# Patient Record
Sex: MALE | Race: WHITE | HISPANIC OR LATINO | Employment: FULL TIME | URBAN - METROPOLITAN AREA
[De-identification: names, ages, dates, MRNs, and addresses within clinical notes are randomized per-mention and may not be internally consistent; named-entity substitution may affect disease eponyms.]

---

## 2024-08-23 ENCOUNTER — OFFICE VISIT (OUTPATIENT)
Dept: INTERNAL MEDICINE | Facility: CLINIC | Age: 34
End: 2024-08-23
Payer: COMMERCIAL

## 2024-08-23 VITALS
HEIGHT: 68 IN | TEMPERATURE: 99.3 F | DIASTOLIC BLOOD PRESSURE: 90 MMHG | HEART RATE: 82 BPM | WEIGHT: 212.3 LBS | SYSTOLIC BLOOD PRESSURE: 150 MMHG | OXYGEN SATURATION: 99 % | BODY MASS INDEX: 32.18 KG/M2

## 2024-08-23 DIAGNOSIS — H54.61 VISION LOSS, RIGHT EYE: Primary | ICD-10-CM

## 2024-08-23 DIAGNOSIS — Z13.0 SCREENING FOR BLOOD DISEASE: ICD-10-CM

## 2024-08-23 DIAGNOSIS — Z91.09 ENVIRONMENTAL ALLERGIES: ICD-10-CM

## 2024-08-23 DIAGNOSIS — Z13.1 DIABETES MELLITUS SCREENING: ICD-10-CM

## 2024-08-23 DIAGNOSIS — Z13.21 ENCOUNTER FOR VITAMIN DEFICIENCY SCREENING: ICD-10-CM

## 2024-08-23 DIAGNOSIS — Z13.220 LIPID SCREENING: ICD-10-CM

## 2024-08-23 DIAGNOSIS — Z23 NEED FOR VACCINATION: ICD-10-CM

## 2024-08-23 DIAGNOSIS — K42.9 UMBILICAL HERNIA WITHOUT OBSTRUCTION AND WITHOUT GANGRENE: ICD-10-CM

## 2024-08-23 DIAGNOSIS — N48.89 PENILE PAIN: ICD-10-CM

## 2024-08-23 DIAGNOSIS — Z13.29 SCREENING FOR THYROID DISORDER: ICD-10-CM

## 2024-08-23 RX ORDER — LEVOCETIRIZINE DIHYDROCHLORIDE 5 MG/1
1 TABLET, FILM COATED ORAL DAILY
COMMUNITY
Start: 2024-07-30

## 2024-08-23 NOTE — LETTER
[facility]  Vaccine Consent Form    Patient Name:  Mark Zamora  Patient :  1990     Vaccine(s) Ordered    HPV Vaccine  Tdap Vaccine Greater Than or Equal To 6yo IM        Screening Checklist  The following questions should be completed prior to vaccination. If you answer “yes” to any question, it does not necessarily mean you should not be vaccinated. It just means we may need to clarify or ask more questions. If a question is unclear, please ask your healthcare provider to explain it.    Yes No   Any fever or moderate to severe illness today (mild illness and/or antibiotic treatment are not contraindications)?     Do you have a history of a serious reaction to any previous vaccinations, such as anaphylaxis, encephalopathy within 7 days, Guillain-Dunkirk syndrome within 6 weeks, seizure?     Have you received any live vaccine(s) (e.g MMR, ANEESH) or any other vaccines in the last month (to ensure duplicate doses aren't given)?     Do you have an anaphylactic allergy to latex (DTaP, DTaP-IPV, Hep A, Hep B, MenB, RV, Td, Tdap), baker’s yeast (Hep B, HPV), polysorbates (RSV, nirsevimab, PCV 20, Rotavirrus, Tdap, Shingrix), or gelatin (ANEESH, MMR)?     Do you have an anaphylactic allergy to neomycin (Rabies, ANEESH, MMR, IPV, Hep A), polymyxin B (IPV), or streptomycin (IPV)?      Any cancer, leukemia, AIDS, or other immune system disorder? (ANEESH, MMR, RV)     Do you have a parent, brother, or sister with an immune system problem (if immune competence of vaccine recipient clinically verified, can proceed)? (MMR, ANEESH)     Any recent steroid treatments for >2 weeks, chemotherapy, or radiation treatment? (ANEESH, MMR)     Have you received antibody-containing blood transfusions or IVIG in the past 11 months (recommended interval is dependent on product)? (MMR, ANEESH)     Have you taken antiviral drugs (acyclovir, famciclovir, valacyclovir for ANEESH) in the last 24 or 48 hours, respectively?      Are you pregnant or planning to  "become pregnant within 1 month? (ANEESH, MMR, HPV, IPV, MenB, Abrexvy; For Hep B- refer to Engerix-B; For RSV - Abrysvo is indicated for 32-36 weeks of pregnancy from September to January)     For infants, have you ever been told your child has had intussusception or a medical emergency involving obstruction of the intestine (Rotavirus)? If not for an infant, can skip this question.         *Ordering Physicians/APC should be consulted if \"yes\" is checked by the patient or guardian above.  I have received, read, and understand the Vaccine Information Statement (VIS) for each vaccine ordered.  I have considered my or my child's health status as well as the health status of my close contacts.  I have taken the opportunity to discuss my vaccine questions with my or my child's health care provider.   I have requested that the ordered vaccine(s) be given to me or my child.  I understand the benefits and risks of the vaccines.  I understand that I should remain in the clinic for 15 minutes after receiving the vaccine(s).  _________________________________________________________  Signature of Patient or Parent/Legal Guardian ____________________  Date     "

## 2024-08-23 NOTE — PROGRESS NOTES
[facility] Internal Medicine    Mark Zamora  1990   [id number]      Patient Care Team:  Carmenza Flores PA-C as PCP - General (Physician Assistant)    Chief Complaint::   Chief Complaint   Patient presents with    Hernia     Umbilical Noticed 2 months ago. No pain        HPI  Mark Zamora is a 34-year-old male date of birth 1990 who presents today to establish care.  He is referred by his Sister Linda, who is also an established patient of mine.  He was born and raised in [place], has lived in the  for 4 years.  He lives in [place] and works full-time at [employer] in [employer].  He works first shift from 5:30 AM to 2:45 PM with occasional overtime to 5:00 PM.  He does have weekends off.      He reports mother and father both live back home in [place].  Mother-HTN  Father--HTN, obesity  Sister-asthma.    Surgeries include sinus surgery in 2012.  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  He is also had left knee surgery.    Mark is interested in HPV vaccinations today.  He also inquires about Mpox vaccine, although this is unavailable.  He has not had fasting labs.  He reports umbilical hernia which is gradually worsened over the past several years.  He reports there is pain and discomfort.  He denies change in bowel movements.  He desires circumcision.  He reports there is pain and tightness with intercourse.  Would like referral.    Patient Active Problem List   Diagnosis    Vision loss, right eye    Environmental allergies    Penile pain    Need for vaccination    Screening for blood disease    Screening for thyroid disorder    Encounter for vitamin deficiency screening    Lipid screening    Umbilical hernia without obstruction and without gangrene        No past medical history on file.    Past Surgical History:   Procedure Laterality Date    EYE SURGERY Right     optic nerve severed during sinus surgery     "KNEE CARTILAGE SURGERY Left     high school in [facility]    SINUS SURGERY  2012       Family History   Problem Relation Age of Onset    Hypertension Mother     Hypertension Father        Social History     Socioeconomic History    Marital status: Single   Tobacco Use    Smoking status: Some Days     Types: Cigarettes   Vaping Use    Vaping status: Some Days    Substances: Nicotine   Substance and Sexual Activity    Alcohol use: Yes     Alcohol/week: 1.0 standard drink of alcohol     Types: 1 Cans of beer per week    Drug use: Never       No Known Allergies    Review of Systems   Constitutional:  Negative for activity change, appetite change, diaphoresis, fatigue, unexpected weight gain and unexpected weight loss.   HENT:  Negative for hearing loss.    Eyes:  Negative for visual disturbance.        Vision loss of the right eye   Respiratory:  Negative for chest tightness and shortness of breath.    Cardiovascular:  Negative for chest pain, palpitations and leg swelling.   Gastrointestinal:  Positive for abdominal distention. Negative for abdominal pain, blood in stool, GERD and indigestion.   Endocrine: Negative for cold intolerance and heat intolerance.   Genitourinary:  Negative for dysuria and hematuria.   Musculoskeletal:  Negative for arthralgias and myalgias.   Skin:  Negative for skin lesions.   Neurological:  Negative for tremors, seizures, syncope, speech difficulty, weakness, headache, memory problem and confusion.   Hematological:  Does not bruise/bleed easily.   Psychiatric/Behavioral:  Negative for sleep disturbance and depressed mood. The patient is not nervous/anxious.         Vital Signs  Vitals:    08/23/24 1437   BP: 150/90   BP Location: Right arm   Patient Position: Sitting   Cuff Size: Adult   Pulse: 82   Temp: 99.3 °F (37.4 °C)   TempSrc: Infrared   SpO2: 99%   Weight: 96.3 kg (212 lb 4.8 oz)   Height: 173 cm (68.11\")   PainSc: 0-No pain     Body mass index is 32.18 kg/m².  BMI is >= 30 and <35. " (Class 1 Obesity). The following options were offered after discussion;: weight loss educational material (shared in after visit summary), exercise counseling/recommendations, and nutrition counseling/recommendations     Advance Care Planning   ACP discussion was held with the patient during this visit. Patient does not have an advance directive, information provided.       Current Outpatient Medications:     levocetirizine (XYZAL) 5 MG tablet, Take 1 tablet by mouth Daily., Disp: , Rfl:     Physical Exam  Vitals reviewed.   Constitutional:       Appearance: Normal appearance. He is well-developed.   HENT:      Head: Normocephalic and atraumatic.      Right Ear: Hearing, tympanic membrane, ear canal and external ear normal.      Left Ear: Hearing, tympanic membrane, ear canal and external ear normal.      Nose: Nose normal.      Mouth/Throat:      Pharynx: Uvula midline.   Eyes:      General: Lids are normal.      Conjunctiva/sclera: Conjunctivae normal.      Pupils: Pupils are equal, round, and reactive to light.   Cardiovascular:      Rate and Rhythm: Normal rate and regular rhythm.      Pulses: Normal pulses.      Heart sounds: Normal heart sounds.   Pulmonary:      Effort: Pulmonary effort is normal.      Breath sounds: Normal breath sounds.   Abdominal:      General: Bowel sounds are normal. There is distension.      Palpations: Abdomen is soft.      Tenderness: There is abdominal tenderness.      Comments: Nickel sized hernia at umbilicus   Musculoskeletal:         General: Normal range of motion.      Cervical back: Full passive range of motion without pain, normal range of motion and neck supple.   Skin:     General: Skin is warm and dry.   Neurological:      General: No focal deficit present.      Mental Status: He is alert and oriented to person, place, and time. Mental status is at baseline.      Deep Tendon Reflexes: Reflexes are normal and symmetric.   Psychiatric:         Speech: Speech normal.          Behavior: Behavior normal.         Thought Content: Thought content normal.         Judgment: Judgment normal.          ACE III MINI            Results Review:    No results found for this or any previous visit (from the past 672 hour(s)).  Procedures    Medication Review: Medications reviewed and noted    Social History     Socioeconomic History    Marital status: Single   Tobacco Use    Smoking status: Some Days     Types: Cigarettes   Vaping Use    Vaping status: Some Days    Substances: Nicotine   Substance and Sexual Activity    Alcohol use: Yes     Alcohol/week: 1.0 standard drink of alcohol     Types: 1 Cans of beer per week    Drug use: Never        Assessment/Plan:    Diagnoses and all orders for this visit:    1. Vision loss, right eye (Primary)  Overview:  Optic nerve severed during sinus surgery in [facility].      2. Environmental allergies  Overview:  Xyzal daily, which helps.      3. Penile pain  -     Ambulatory Referral to Urology    4. Need for vaccination  -     Cancel: Tdap Vaccine Greater Than or Equal To 6yo IM  -     HPV Vaccine  -     Tdap Vaccine Greater Than or Equal To 6yo IM    5. Screening for blood disease  -     Cancel: CBC & Differential; Future  -     CBC & Differential; Future    6. Screening for thyroid disorder  -     Cancel: TSH; Future  -     Cancel: T3, Free; Future  -     Cancel: T4, Free; Future  -     T3, Free; Future  -     T4, Free; Future  -     TSH; Future    7. Diabetes mellitus screening  -     Cancel: Comprehensive Metabolic Panel; Future  -     Cancel: Hemoglobin A1c; Future  -     Comprehensive Metabolic Panel; Future  -     Hemoglobin A1c; Future    8. Encounter for vitamin deficiency screening  -     Cancel: Vitamin B12; Future  -     Cancel: Vitamin D,25-Hydroxy; Future  -     Vitamin B12; Future  -     Vitamin D,25-Hydroxy; Future    9. Lipid screening  -     Cancel: Lipid Panel; Future  -     Lipid Panel; Future    10. Umbilical hernia without obstruction and  without gangrene  -     Ambulatory Referral to General Surgery         Patient Instructions   Voluntades anticipadas  Advance Directive    Las voluntades anticipadas son documentos legales que le permiten vicki decisiones acerca de michael tratamiento médico y atención de la rojas en randi de no poder expresarse usted mismo. Las voluntades anticipadas comunican annabel deseos a familiares, amigos y médicos.  La elaboración y la redacción de las voluntades anticipadas deben realizarse con el transcurso del tiempo, en lugar de que suceda todo a la misma vez. Las voluntades anticipadas se pueden cambiar y actualizar en cualquier momento. Hay diferentes tipos de voluntades anticipadas, por ejemplo:  Poder notarial médico.  Testamento vital.  Orden de no reanimar (ONR) o de no intentar la reanimación (ONIR).  Apoderado para asuntos de rojas y poder notarial médico  Al apoderado para asuntos de rojas también se le llama representante para asuntos de rojas. Esta persona es designada para vicki decisiones médicas en representación suya cuando usted no pueda vikci decisiones por usted mismo. Por lo general, las personas le piden a un amigo o familiar de confianza que actúe dahlia michael apoderado y represente annabel preferencias. Asegúrese de tener un acuerdo con michael persona de confianza para que actúe dahlia michael apoderado. Es posible que un apoderado deba vicki ryland decisión médica en michael nombre si no se conocen annabel deseos.  Un poder notarial médico, también llamado poder notarial duradero para asuntos de rojas, es un documento legal que nombra a michael apoderado para asuntos de rojas. En función de las leyes de michael estado, es posible que el documento también deba estar:  Firmado.  Autenticado ante ranjana.  Fechado.  Copiado.  Atestiguado.  Incorporado a la historia clínica del paciente.  También es posible que desee designar a ryland persona de confianza para administrar michael dinero en randi de que usted no pueda hacerlo. [place] se denomina poder notarial  duradero para asuntos financieros. Nila es un documento legal diferente del poder notarial duradero para asuntos de rojas. Usted puede elegir a michael apoderado para asuntos de rojas o a ryland persona diferente para que actúe dahlia michael apoderado en los asuntos de dinero.  Si no asigna un apoderado, o si se considera que el apoderado no está actuando para michael odalis, un tribunal podrá designar un  legal para que actúe en representación suya.  El testamento vital  El testamento vital es un conjunto de instrucciones en las que se establecen annabel deseos acerca de la atención médica para el momento en que no pueda expresarlos usted mismo. Los médicos deben conservar ryland copia del testamento vital en michael historia clínica. Le recomendamos que entregue ryland copia a familiares o amigos. Con el fin de alertar a annabel cuidadores en randi de ryland emergencia, puede colocar ryland tarjeta en la billetera que indique que tiene un testamento vital y dónde encontrarlo. El testamento vital se usa si:  Tiene ryland enfermedad terminal.  Queda discapacitado.  No puede comunicarse ni vicki decisiones.  Debe incluir las siguientes decisiones en michael testamento vital:  El uso o no uso de equipos de soporte vital, dahlia dispositivos de diálisis y máquinas respiratorias (respiradores).  Si desea ryland ONR o ryland ONIR. Ages les indica a los médicos que no usen resucitación cardiopulmonar (RCP) si se le detiene la respiración o los latidos cardíacos.  El uso o no uso de alimentación por sonda.  La administración o no administración de alimentos y líquidos.  Si desea recibir atención de alivio (cuidados paliativos) cuando el objetivo sea la comodidad más que la angeles.  Si desea donar annabel órganos y tejidos.  El testamento vital no da instrucciones acerca de la distribución del dinero ni las propiedades en randi de fallecimiento.  ONR u ONIR  La ONR es el pedido de no realizar RCP en el randi de que el corazón o la respiración se detengan. Si no se realizó ni se presentó  ryland orden ONR u ONIR, el médico intentará ayudar a cualquier paciente cuyo corazón o respiración se haya detenido. Si planea someterse a ryland cirugía, hable con michael médico sobre el cumplimiento de michael ONR u ONIR en randi de que surjan problemas.  ¿Qué sucede si no tengo ryland voluntad anticipada?  Algunos estados asignan familiares a cargo de la bárbara de decisiones para que actúen en representación suya si no tiene ryland voluntad anticipada. Cada estado tiene annabel propias leyes sobre las voluntades anticipadas. Es posible que desee consultar a michael médico, michael abogado o al representante estatal acerca de las leyes de michael estado.  Resumen  Las voluntades anticipadas son documentos legales que le permiten vicki decisiones acerca de michael tratamiento médico y atención de la rojas en randi de no poder expresarse usted mismo.  El proceso de elaborar y redactar las voluntades anticipadas debe realizarse con el transcurso del tiempo. Usted puede cambiar y actualizar las voluntades anticipadas en cualquier momento.  Las voluntades anticipadas pueden incluir un poder médico, un testamento vital y ryland orden ONR u ONIR.  Esta información no tiene dahlia fin reemplazar el consejo del médico. Asegúrese de hacerle al médico cualquier pregunta que tenga.  Document Revised: 10/14/2021 Document Reviewed: 10/14/2021  Elsevier Patient Education © 2024 Elsevier Inc.  BMI for Adults  Body mass index (BMI) is a number found using a person's weight and height. BMI can help tell how much of a person's weight is made up of fat. BMI does not measure body fat directly. It is used instead of tests that directly measure body fat, which can be difficult and expensive.  What are BMI measurements used for?  BMI is useful to:  Find out if your weight puts you at higher risk for medical problems.  Help recommend changes, such as in diet and exercise. This can help you reach a healthy weight. BMI screening can be done again to see if these changes are working.  How is  "BMI calculated?  Your height and weight are measured. The BMI is found from those numbers. This can be done with U.S. or metric measurements. Note that charts and online BMI calculators are available to help you find your BMI quickly and easily without doing these calculations.  To calculate your BMI in U.S. measurements:  Measure your weight in pounds (lb).  Multiply the number of pounds by 703.  So, for an adult who weighs 150 lb, multiply that number by 703: 150 x 703, which equals 105,450.  Measure your height in inches. Then multiply that number by itself to get a measurement called \"inches squared.\"  So, for an adult who is 70 inches tall, the \"inches squared\" measurement is 70 inches x 70 inches, which equals 4,900 inches squared.  Divide the total from step 2 (number of lb x 703) by the total from step 3 (inches squared): 105,450 ÷ 4,900 = 21.5. This is your BMI.  To calculate your BMI in metric measurements:    Measure your weight in kilograms (kg).  For this example, the weight is 70 kg.  Measure your height in meters (m). Then multiply that number by itself to get a measurement called \"meters squared.\"  So, for an adult who is 1.75 m tall, the \"meters squared\" measurement is 1.75 m x 1.75 m, which equals 3.1 meters squared.  Divide the number of kilograms (your weight) by the meters squared number. In this example: 70 ÷ 3.1 = 22.6. This is your BMI.  What do the results mean?  BMI charts are used to see if you are underweight, normal weight, overweight, or obese. The following guidelines will be used:  Underweight: BMI less than 18.5.  Normal weight: BMI between 18.5 and 24.9.  Overweight: BMI between 25 and 29.9.  Obese: BMI of 30 or above.  BMI is a tool and cannot diagnose a condition. Talk with your health care provider about what your BMI means for you. Keep these notes in mind:  Weight includes fat and muscle. Someone with a muscular build, such as an athlete, may have a BMI that is higher than " 24.9. In cases like these, BMI is not a correct measure of body fat.  If you have a BMI of 25 or higher, your provider may need to do more testing to find out if excess body fat is the cause.  BMI is measured the same way for males and females. Females usually have more body fat than males of the same height and weight.  Where to find more information  For more information about BMI, including tools to quickly find your BMI, go to:  Centers for Disease Control and Prevention: cdc.gov  American Heart Association: heart.org  National Heart, Lung, [facility] [place]: nhlbi.nih.gov  This information is not intended to replace advice given to you by your health care provider. Make sure you discuss any questions you have with your health care provider.  Document Revised: 09/07/2023 Document Reviewed: 08/31/2023  [employer] Patient Education © 2024 [employer]         Plan of care reviewed with patient at the conclusion of today's visit. Education was provided regarding diagnosis, management, and any prescribed or recommended OTC medications.Patient verbalizes understanding of and agreement with management plan.         I spent 30 minutes caring for Mark on this date of service. This time includes time spent by me in the following activities:preparing for the visit, reviewing tests, obtaining and/or reviewing a separately obtained history, performing a medically appropriate examination and/or evaluation , counseling and educating the patient/family/caregiver, ordering medications, tests, or procedures, referring and communicating with other health care professionals , and documenting information in the medical record    Carmenza Flores PA-C      Note: Part of this note may be an electronic transcription/translation of spoken language to printed text using the Dragon Dictation system.

## 2024-08-25 PROBLEM — N48.89 PENILE PAIN: Status: ACTIVE | Noted: 2024-08-25

## 2024-08-25 PROBLEM — Z23 NEED FOR VACCINATION: Status: ACTIVE | Noted: 2024-08-25

## 2024-08-25 PROBLEM — Z13.21 ENCOUNTER FOR VITAMIN DEFICIENCY SCREENING: Status: ACTIVE | Noted: 2024-08-25

## 2024-08-25 PROBLEM — Z13.220 LIPID SCREENING: Status: ACTIVE | Noted: 2024-08-25

## 2024-08-25 PROBLEM — Z13.29 SCREENING FOR THYROID DISORDER: Status: ACTIVE | Noted: 2024-08-25

## 2024-08-25 PROBLEM — K42.9 UMBILICAL HERNIA WITHOUT OBSTRUCTION AND WITHOUT GANGRENE: Status: ACTIVE | Noted: 2024-08-25

## 2024-08-25 PROBLEM — Z13.0 SCREENING FOR BLOOD DISEASE: Status: ACTIVE | Noted: 2024-08-25

## 2024-08-25 NOTE — PATIENT INSTRUCTIONS
Voluntades anticipadas  Advance Directive    Las voluntades anticipadas son documentos legales que le permiten vicki decisiones acerca de michael tratamiento médico y atención de la rojas en randi de no poder expresarse usted mismo. Las voluntades anticipadas comunican annabel deseos a familiares, amigos y médicos.  La elaboración y la redacción de las voluntades anticipadas deben realizarse con el transcurso del tiempo, en lugar de que suceda todo a la misma vez. Las voluntades anticipadas se pueden cambiar y actualizar en cualquier momento. Hay diferentes tipos de voluntades anticipadas, por ejemplo:  Poder notarial médico.  Testamento vital.  Orden de no reanimar (ONR) o de no intentar la reanimación (ONIR).  Apoderado para asuntos de rojas y poder notarial médico  Al apoderado para asuntos de rojas también se le llama representante para asuntos de rojas. Esta persona es designada para vicki decisiones médicas en representación suya cuando usted no pueda vicki decisiones por usted mismo. Por lo general, las personas le piden a un amigo o familiar de confianza que actúe dahlia michael apoderado y represente annabel preferencias. Asegúrese de tener un acuerdo con michael persona de confianza para que actúe dahlia michael apoderado. Es posible que un apoderado deba vicki ryland decisión médica en michael nombre si no se conocen annabel deseos.  Un poder notarial médico, también llamado poder notarial duradero para asuntos de rojas, es un documento legal que nombra a michael apoderado para asuntos de rojas. En función de las leyes de michael estado, es posible que el documento también deba estar:  Firmado.  Autenticado ante ranjana.  Fechado.  Copiado.  Atestiguado.  Incorporado a la historia clínica del paciente.  También es posible que desee designar a ryland persona de confianza para administrar michael dinero en randi de que usted no pueda hacerlo. [place] se denomina poder notarial duradero para asuntos financieros. [place] es un documento legal diferente del poder notarial  duradero para asuntos de rojas. Usted puede elegir a michael apoderado para asuntos de rojas o a ryland persona diferente para que actúe dahlia micheal apoderado en los asuntos de dinero.  Si no asigna un apoderado, o si se considera que el apoderado no está actuando para michael odalis, un tribunal podrá designar un  legal para que actúe en representación suya.  El testamento vital  El testamento vital es un conjunto de instrucciones en las que se establecen annabel deseos acerca de la atención médica para el momento en que no pueda expresarlos usted mismo. Los médicos deben conservar ryland copia del testamento vital en michael historia clínica. Le recomendamos que entregue ryland copia a familiares o amigos. Con el fin de alertar a annabel cuidadores en randi de ryland emergencia, puede colocar ryland tarjeta en la billetera que indique que tiene un testamento vital y dónde encontrarlo. El testamento vital se usa si:  Tiene ryland enfermedad terminal.  Queda discapacitado.  No puede comunicarse ni vicki decisiones.  Debe incluir las siguientes decisiones en michael testamento vital:  El uso o no uso de equipos de soporte vital, dahlia dispositivos de diálisis y máquinas respiratorias (respiradores).  Si desea ryland ONR o ryland ONIR. [place] les indica a los médicos que no usen resucitación cardiopulmonar (RCP) si se le detiene la respiración o los latidos cardíacos.  El uso o no uso de alimentación por sonda.  La administración o no administración de alimentos y líquidos.  Si desea recibir atención de alivio (cuidados paliativos) cuando el objetivo sea la comodidad más que la angeles.  Si desea donar annabel órganos y tejidos.  El testamento vital no da instrucciones acerca de la distribución del dinero ni las propiedades en randi de fallecimiento.  ONR u ONIR  La ONR es el pedido de no realizar RCP en el randi de que el corazón o la respiración se detengan. Si no se realizó ni se presentó ryland orden ONR u ONIR, el médico intentará ayudar a cualquier paciente cuyo corazón o  respiración se haya detenido. Si planea someterse a ryland cirugía, hable con michael médico sobre el cumplimiento de michael ONR u ONIR en randi de que surjan problemas.  ¿Qué sucede si no tengo ryland voluntad anticipada?  Algunos estados asignan familiares a cargo de la bárbara de decisiones para que actúen en representación suya si no tiene ryland voluntad anticipada. Cada estado tiene annabel propias leyes sobre las voluntades anticipadas. Es posible que desee consultar a michael médico, michael abogado o al representante estatal acerca de las leyes de michael estado.  Resumen  Las voluntades anticipadas son documentos legales que le permiten vicki decisiones acerca de michael tratamiento médico y atención de la rojas en randi de no poder expresarse usted mismo.  El proceso de elaborar y redactar las voluntades anticipadas debe realizarse con el transcurso del tiempo. Usted puede cambiar y actualizar las voluntades anticipadas en cualquier momento.  Las voluntades anticipadas pueden incluir un poder médico, un testamento vital y ryland orden ONR u ONIR.  Esta información no tiene dahlia fin reemplazar el consejo del médico. Asegúrese de hacerle al médico cualquier pregunta que tenga.  Document Revised: 10/14/2021 Document Reviewed: 10/14/2021  Elsevier Patient Education © 2024 [employer]  BMI for Adults  Body mass index (BMI) is a number found using a person's weight and height. BMI can help tell how much of a person's weight is made up of fat. BMI does not measure body fat directly. It is used instead of tests that directly measure body fat, which can be difficult and expensive.  What are BMI measurements used for?  BMI is useful to:  Find out if your weight puts you at higher risk for medical problems.  Help recommend changes, such as in diet and exercise. This can help you reach a healthy weight. BMI screening can be done again to see if these changes are working.  How is BMI calculated?  Your height and weight are measured. The BMI is found from those  "numbers. This can be done with U.S. or metric measurements. Note that charts and online BMI calculators are available to help you find your BMI quickly and easily without doing these calculations.  To calculate your BMI in U.S. measurements:  Measure your weight in pounds (lb).  Multiply the number of pounds by 703.  So, for an adult who weighs 150 lb, multiply that number by 703: 150 x 703, which equals 105,450.  Measure your height in inches. Then multiply that number by itself to get a measurement called \"inches squared.\"  So, for an adult who is 70 inches tall, the \"inches squared\" measurement is 70 inches x 70 inches, which equals 4,900 inches squared.  Divide the total from step 2 (number of lb x 703) by the total from step 3 (inches squared): 105,450 ÷ 4,900 = 21.5. This is your BMI.  To calculate your BMI in metric measurements:    Measure your weight in kilograms (kg).  For this example, the weight is 70 kg.  Measure your height in meters (m). Then multiply that number by itself to get a measurement called \"meters squared.\"  So, for an adult who is 1.75 m tall, the \"meters squared\" measurement is 1.75 m x 1.75 m, which equals 3.1 meters squared.  Divide the number of kilograms (your weight) by the meters squared number. In this example: 70 ÷ 3.1 = 22.6. This is your BMI.  What do the results mean?  BMI charts are used to see if you are underweight, normal weight, overweight, or obese. The following guidelines will be used:  Underweight: BMI less than 18.5.  Normal weight: BMI between 18.5 and 24.9.  Overweight: BMI between 25 and 29.9.  Obese: BMI of 30 or above.  BMI is a tool and cannot diagnose a condition. Talk with your health care provider about what your BMI means for you. Keep these notes in mind:  Weight includes fat and muscle. Someone with a muscular build, such as an athlete, may have a BMI that is higher than 24.9. In cases like these, BMI is not a correct measure of body fat.  If you have a " BMI of 25 or higher, your provider may need to do more testing to find out if excess body fat is the cause.  BMI is measured the same way for males and females. Females usually have more body fat than males of the same height and weight.  Where to find more information  For more information about BMI, including tools to quickly find your BMI, go to:  Centers for Disease Control and Prevention: cdc.gov  American Heart Association: heart.org  National Heart, Lung, and Blood [place]: nhlbi.nih.gov  This information is not intended to replace advice given to you by your health care provider. Make sure you discuss any questions you have with your health care provider.  Document Revised: 09/07/2023 Document Reviewed: 08/31/2023  Elsevier Patient Education © 2024 Elsevier Inc.

## 2024-09-22 DIAGNOSIS — E55.9 VITAMIN D DEFICIENCY: Primary | ICD-10-CM

## 2024-09-22 RX ORDER — ERGOCALCIFEROL 1.25 MG/1
50000 CAPSULE, LIQUID FILLED ORAL WEEKLY
Qty: 12 CAPSULE | Refills: 0 | Status: SHIPPED | OUTPATIENT
Start: 2024-09-22

## 2024-09-23 DIAGNOSIS — E78.2 MIXED HYPERLIPIDEMIA: Primary | ICD-10-CM

## 2024-09-23 RX ORDER — ROSUVASTATIN CALCIUM 5 MG/1
5 TABLET, COATED ORAL DAILY
Qty: 90 TABLET | Refills: 1 | Status: SHIPPED | OUTPATIENT
Start: 2024-09-23

## 2024-10-01 ENCOUNTER — OFFICE VISIT (OUTPATIENT)
Dept: INTERNAL MEDICINE | Facility: CLINIC | Age: 34
End: 2024-10-01
Payer: COMMERCIAL

## 2024-10-01 VITALS
HEART RATE: 78 BPM | TEMPERATURE: 98.2 F | SYSTOLIC BLOOD PRESSURE: 132 MMHG | WEIGHT: 208.4 LBS | DIASTOLIC BLOOD PRESSURE: 90 MMHG | HEIGHT: 68 IN | OXYGEN SATURATION: 97 % | BODY MASS INDEX: 31.58 KG/M2

## 2024-10-01 DIAGNOSIS — Z91.09 ENVIRONMENTAL ALLERGIES: ICD-10-CM

## 2024-10-01 DIAGNOSIS — Z23 NEED FOR HPV VACCINATION: ICD-10-CM

## 2024-10-01 DIAGNOSIS — Z00.00 ROUTINE MEDICAL EXAM: Primary | ICD-10-CM

## 2024-10-01 DIAGNOSIS — N48.89 PENILE PAIN: ICD-10-CM

## 2024-10-01 DIAGNOSIS — R03.0 ELEVATED BLOOD PRESSURE READING: ICD-10-CM

## 2024-10-01 DIAGNOSIS — K42.9 UMBILICAL HERNIA WITHOUT OBSTRUCTION AND WITHOUT GANGRENE: ICD-10-CM

## 2024-10-01 PROCEDURE — 90677 PCV20 VACCINE IM: CPT | Performed by: PHYSICIAN ASSISTANT

## 2024-10-01 PROCEDURE — 90471 IMMUNIZATION ADMIN: CPT | Performed by: PHYSICIAN ASSISTANT

## 2024-10-01 PROCEDURE — 90472 IMMUNIZATION ADMIN EACH ADD: CPT | Performed by: PHYSICIAN ASSISTANT

## 2024-10-01 PROCEDURE — 99213 OFFICE O/P EST LOW 20 MIN: CPT | Performed by: PHYSICIAN ASSISTANT

## 2024-10-01 PROCEDURE — 90651 9VHPV VACCINE 2/3 DOSE IM: CPT | Performed by: PHYSICIAN ASSISTANT

## 2024-10-01 PROCEDURE — 99395 PREV VISIT EST AGE 18-39: CPT | Performed by: PHYSICIAN ASSISTANT

## 2024-10-01 NOTE — PROGRESS NOTES
[facility] Internal Medicine    Mark Zamora  1990   [id number]      Patient Care Team:  Carmenza Flores PA-C as PCP - General (Physician Assistant)    Chief Complaint::   Chief Complaint   Patient presents with    Annual Exam        HPI    Mark Zamora is a 34-year-old male date of birth 1990 who presents today for routine physical.  He was born and raised in [place], has lived in the  for 4 years.  He lives in [place] and works full-time at [employer] in [employer]. Works first shift from 5:30 AM to 2:45 PM with occasional overtime to 5:00 PM.  He does have weekends off.       Strong family history of hypertension and obesity.      Surgeries include sinus surgery in 2012.  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  He is also had left knee surgery.  He has upcoming neurology appointment on 10/30/2024 to discuss circumcision.  He has been referred to general surgery for hernia.     Mark is sexually active with same sex partner and is current on STD testing.  He is here today for #2 HPV vaccine.  He is also interested in Mpox vaccine, but understands we do not carry it.  He will check with pharmacy.    Most recent labs show LDL cholesterol 162 and vitamin D 22.  I have sent him a lab letter and patient has started rosuvastatin 5 mg nightly and vitamin D 50,000 units weekly.  He has made attempts at home to change diet.  He is eating less fatty meats and less processed foods.    He denies chest pain, shortness of breath, palpitations.  Did have increase in headaches last night and this morning.  Reports headaches were frontal area.  He denies congestion or sinus drainage.    Patient Active Problem List   Diagnosis    Vision loss, right eye    Environmental allergies    Penile pain    Need for vaccination    Screening for blood disease    Screening for thyroid disorder    Encounter for vitamin deficiency screening     Lipid screening    Umbilical hernia without obstruction and without gangrene    Routine medical exam    Need for HPV vaccination    Elevated blood pressure reading        No past medical history on file.    Past Surgical History:   Procedure Laterality Date    EYE SURGERY Right     optic nerve severed during sinus surgery    KNEE CARTILAGE SURGERY Left     high school in [facility]    SINUS SURGERY  2012       Family History   Problem Relation Age of Onset    Hypertension Mother     Hypertension Father        Social History     Socioeconomic History    Marital status: Single   Tobacco Use    Smoking status: Some Days     Types: Cigarettes   Vaping Use    Vaping status: Former    Substances: Nicotine   Substance and Sexual Activity    Alcohol use: Yes     Alcohol/week: 1.0 standard drink of alcohol     Types: 1 Cans of beer per week    Drug use: Never       No Known Allergies    Review of Systems   Constitutional:  Positive for unexpected weight loss. Negative for activity change, appetite change, diaphoresis, fatigue and unexpected weight gain.   HENT:  Negative for hearing loss.    Eyes:  Negative for visual disturbance.   Respiratory:  Negative for chest tightness and shortness of breath.    Cardiovascular:  Negative for chest pain, palpitations and leg swelling.   Gastrointestinal:  Negative for abdominal pain, blood in stool, GERD and indigestion.   Endocrine: Negative for cold intolerance and heat intolerance.   Genitourinary:  Negative for dysuria and hematuria.   Musculoskeletal:  Negative for arthralgias and myalgias.   Skin:  Negative for skin lesions.   Neurological:  Positive for headache. Negative for tremors, seizures, syncope, speech difficulty, weakness, memory problem and confusion.   Hematological:  Does not bruise/bleed easily.   Psychiatric/Behavioral:  Negative for sleep disturbance and depressed mood. The patient is not nervous/anxious.         Vital Signs  Vitals:    10/01/24 1500 10/01/24 1805  "  BP: 142/84 132/90   BP Location: Right arm    Patient Position: Sitting    Cuff Size: Adult    Pulse: 78    Temp: 98.2 °F (36.8 °C)    TempSrc: Infrared    SpO2: 97%    Weight: 94.5 kg (208 lb 6.4 oz)    Height: 173 cm (68.11\")    PainSc: 0-No pain      Body mass index is 31.58 kg/m².        Advance Care Planning   ACP discussion was held with the patient during this visit. Patient does not have an advance directive, information provided.       Current Outpatient Medications:     levocetirizine (XYZAL) 5 MG tablet, Take 1 tablet by mouth Daily., Disp: , Rfl:     rosuvastatin (Crestor) 5 MG tablet, Take 1 tablet by mouth Daily., Disp: 90 tablet, Rfl: 1    vitamin D (ERGOCALCIFEROL) 1.25 MG (50829 UT) capsule capsule, Take 1 capsule by mouth 1 (One) Time Per Week., Disp: 12 capsule, Rfl: 0    Physical Exam  Vitals reviewed.   Constitutional:       Appearance: Normal appearance. He is well-developed.   HENT:      Head: Normocephalic and atraumatic.      Right Ear: Hearing, tympanic membrane, ear canal and external ear normal.      Left Ear: Hearing, tympanic membrane, ear canal and external ear normal.      Nose: Nose normal.      Mouth/Throat:      Mouth: Mucous membranes are moist.      Pharynx: Oropharynx is clear. Uvula midline.   Eyes:      General: Lids are normal.      Conjunctiva/sclera: Conjunctivae normal.      Pupils: Pupils are equal, round, and reactive to light.   Cardiovascular:      Rate and Rhythm: Normal rate and regular rhythm.      Heart sounds: Normal heart sounds.   Pulmonary:      Effort: Pulmonary effort is normal.      Breath sounds: Normal breath sounds.   Abdominal:      General: Bowel sounds are normal.      Palpations: Abdomen is soft.   Musculoskeletal:         General: Normal range of motion.      Cervical back: Full passive range of motion without pain, normal range of motion and neck supple.   Skin:     General: Skin is warm and dry.   Neurological:      General: No focal deficit " present.      Mental Status: He is alert and oriented to person, place, and time. Mental status is at baseline.      Deep Tendon Reflexes: Reflexes are normal and symmetric.   Psychiatric:         Mood and Affect: Mood normal.         Speech: Speech normal.         Behavior: Behavior normal.         Thought Content: Thought content normal.         Judgment: Judgment normal.          ACE III MINI            Results Review:    No results found for this or any previous visit (from the past 672 hour(s)).  Procedures    Medication Review: Medications reviewed and noted    Social History     Socioeconomic History    Marital status: Single   Tobacco Use    Smoking status: Some Days     Types: Cigarettes   Vaping Use    Vaping status: Former    Substances: Nicotine   Substance and Sexual Activity    Alcohol use: Yes     Alcohol/week: 1.0 standard drink of alcohol     Types: 1 Cans of beer per week    Drug use: Never        Assessment/Plan:    Diagnoses and all orders for this visit:    1. Routine medical exam (Primary)  Overview:  HPV and prevnar 20 today.  Fasting labs reviewed with patient.  Discussed importance of exercise and low-fat low calorie diet.          2. Need for HPV vaccination  -     HPV Vaccine    3. Environmental allergies  Overview:  Xyzal daily, which helps.      4. Penile pain  Overview:  Patient has urology appointment scheduled for 10/30      5. Umbilical hernia without obstruction and without gangrene  Overview:  Has been referred to general surgery.      6. Elevated blood pressure reading  Overview:  Continue to record blood pressures every morning for the next 2 weeks.  I have asked him to send me those recordings.  Cut back on sodium in the diet.  Cut back on alcohol and caffeine.      Other orders  -     Pneumococcal Conjugate Vaccine 20-Valent All         There are no Patient Instructions on file for this visit.     Plan of care reviewed with patient at the conclusion of today's visit. Education  was provided regarding diagnosis, management, and any prescribed or recommended OTC medications.Patient verbalizes understanding of and agreement with management plan.         I spent 54 minutes caring for Mark on this date of service. This time includes time spent by me in the following activities:preparing for the visit, reviewing tests, obtaining and/or reviewing a separately obtained history, performing a medically appropriate examination and/or evaluation , counseling and educating the patient/family/caregiver, ordering medications, tests, or procedures, referring and communicating with other health care professionals , and documenting information in the medical record    Carmenza Flores PA-C      Note: Part of this note may be an electronic transcription/translation of spoken language to printed text using the Dragon Dictation system.

## 2024-10-01 NOTE — LETTER
[facility]  Vaccine Consent Form    Patient Name:  Mark Zamora  Patient :  1990     Vaccine(s) Ordered    HPV Vaccine  Pneumococcal Conjugate Vaccine 20-Valent All        Screening Checklist  The following questions should be completed prior to vaccination. If you answer “yes” to any question, it does not necessarily mean you should not be vaccinated. It just means we may need to clarify or ask more questions. If a question is unclear, please ask your healthcare provider to explain it.    Yes No   Any fever or moderate to severe illness today (mild illness and/or antibiotic treatment are not contraindications)?     Do you have a history of a serious reaction to any previous vaccinations, such as anaphylaxis, encephalopathy within 7 days, Guillain-[place] syndrome within 6 weeks, seizure?     Have you received any live vaccine(s) (e.g MMR, ANEESH) or any other vaccines in the last month (to ensure duplicate doses aren't given)?     Do you have an anaphylactic allergy to latex (DTaP, DTaP-IPV, Hep A, Hep B, MenB, RV, Td, Tdap), baker’s yeast (Hep B, HPV), polysorbates (RSV, nirsevimab, PCV 20, Rotavirrus, Tdap, Shingrix), or gelatin (ANEESH, MMR)?     Do you have an anaphylactic allergy to neomycin (Rabies, ANEESH, MMR, IPV, Hep A), polymyxin B (IPV), or streptomycin (IPV)?      Any cancer, leukemia, AIDS, or other immune system disorder? (ANEESH, MMR, RV)     Do you have a parent, brother, or sister with an immune system problem (if immune competence of vaccine recipient clinically verified, can proceed)? (MMR, ANEESH)     Any recent steroid treatments for >2 weeks, chemotherapy, or radiation treatment? (ANEESH, MMR)     Have you received antibody-containing blood transfusions or IVIG in the past 11 months (recommended interval is dependent on product)? (MMR, ANEESH)     Have you taken antiviral drugs (acyclovir, famciclovir, valacyclovir for ANEESH) in the last 24 or 48 hours, respectively?      Are you pregnant or planning to  "become pregnant within 1 month? (ANEESH, MMR, HPV, IPV, MenB, Abrexvy; For Hep B- refer to Engerix-B; For RSV - Abrysvo is indicated for 32-36 weeks of pregnancy from September to January)     For infants, have you ever been told your child has had intussusception or a medical emergency involving obstruction of the intestine (Rotavirus)? If not for an infant, can skip this question.         *Ordering Physicians/APC should be consulted if \"yes\" is checked by the patient or guardian above.  I have received, read, and understand the Vaccine Information Statement (VIS) for each vaccine ordered.  I have considered my or my child's health status as well as the health status of my close contacts.  I have taken the opportunity to discuss my vaccine questions with my or my child's health care provider.   I have requested that the ordered vaccine(s) be given to me or my child.  I understand the benefits and risks of the vaccines.  I understand that I should remain in the clinic for 15 minutes after receiving the vaccine(s).  _________________________________________________________  Signature of Patient or Parent/Legal Guardian ____________________  Date     "

## 2024-10-01 NOTE — PATIENT INSTRUCTIONS
"BMI for Adults  Body mass index (BMI) is a number found using a person's weight and height. BMI can help tell how much of a person's weight is made up of fat. BMI does not measure body fat directly. It is used instead of tests that directly measure body fat, which can be difficult and expensive.  What are BMI measurements used for?  BMI is useful to:  Find out if your weight puts you at higher risk for medical problems.  Help recommend changes, such as in diet and exercise. This can help you reach a healthy weight. BMI screening can be done again to see if these changes are working.  How is BMI calculated?  Your height and weight are measured. The BMI is found from those numbers. This can be done with U.S[place] or metric measurements. Note that charts and online BMI calculators are available to help you find your BMI quickly and easily without doing these calculations.  To calculate your BMI in U.S[place] measurements:  Measure your weight in pounds (lb).  Multiply the number of pounds by 703.  So, for an adult who weighs 150 lb, multiply that number by 703: 150 x 703, which equals 105,450.  Measure your height in inches. Then multiply that number by itself to get a measurement called \"inches squared.\"  So, for an adult who is 70 inches tall, the \"inches squared\" measurement is 70 inches x 70 inches, which equals 4,900 inches squared.  Divide the total from step 2 (number of lb x 703) by the total from step 3 (inches squared): 105,450 ÷ 4,900 = 21.5. This is your BMI.  To calculate your BMI in metric measurements:    Measure your weight in kilograms (kg).  For this example, the weight is 70 kg.  Measure your height in meters (m). Then multiply that number by itself to get a measurement called \"meters squared.\"  So, for an adult who is 1.75 m tall, the \"meters squared\" measurement is 1.75 m x 1.75 m, which equals 3.1 meters squared.  Divide the number of kilograms (your weight) by the meters squared number. In this example: 70 " ÷ 3.1 = 22.6. This is your BMI.  What do the results mean?  BMI charts are used to see if you are underweight, normal weight, overweight, or obese. The following guidelines will be used:  Underweight: BMI less than 18.5.  Normal weight: BMI between 18.5 and 24.9.  Overweight: BMI between 25 and 29.9.  Obese: BMI of 30 or above.  BMI is a tool and cannot diagnose a condition. Talk with your health care provider about what your BMI means for you. Keep these notes in mind:  Weight includes fat and muscle. Someone with a muscular build, such as an athlete, may have a BMI that is higher than 24.9. In cases like these, BMI is not a correct measure of body fat.  If you have a BMI of 25 or higher, your provider may need to do more testing to find out if excess body fat is the cause.  BMI is measured the same way for males and females. Females usually have more body fat than males of the same height and weight.  Where to find more information  For more information about BMI, including tools to quickly find your BMI, go to:  Centers for Disease Control and Prevention: cdc.gov  American Heart Association: heart.org  National Heart, Lung, and Blood [place]: nhlbi.nih.gov  This information is not intended to replace advice given to you by your health care provider. Make sure you discuss any questions you have with your health care provider.  Document Revised: 09/07/2023 Document Reviewed: 08/31/2023  [employer] Patient Education © 2024 [employer].  Voluntades anticipadas  Advance Directive    Las voluntades anticipadas son documentos legales que le permiten vicki decisiones acerca de michael tratamiento médico y atención de la rojas en randi de no poder expresarse usted mismo. Las voluntades anticipadas comunican annabel deseos a familiares, amigos y médicos.  La elaboración y la redacción de las voluntades anticipadas deben realizarse con el transcurso del tiempo, en lugar de que suceda todo a la misma vez. Las voluntades anticipadas  se pueden cambiar y actualizar en cualquier momento. Hay diferentes tipos de voluntades anticipadas, por ejemplo:  Poder notarial médico.  Testamento vital.  Orden de no reanimar (ONR) o de no intentar la reanimación (ONIR).  Apoderado para asuntos de rojas y poder notarial médico  Al apoderado para asuntos de rojas también se le llama representante para asuntos de rojas. Esta persona es designada para vicki decisiones médicas en representación suya cuando usted no pueda vicki decisiones por usted mismo. Por lo general, las personas le piden a un amigo o familiar de confianza que actúe dahlia michael apoderado y represente annabel preferencias. Asegúrese de tener un acuerdo con michael persona de confianza para que actúe dahlia michael apoderado. Es posible que un apoderado deba vicki ryland decisión médica en michael nombre si no se conocen annabel deseos.  Un poder notarial médico, también llamado poder notarial duradero para asuntos de rojas, es un documento legal que nombra a michael apoderado para asuntos de rojas. En función de las leyes de michael estado, es posible que el documento también deba estar:  Firmado.  Autenticado ante ranjana.  Fechado.  Copiado.  Atestiguado.  Incorporado a la historia clínica del paciente.  También es posible que desee designar a ryland persona de confianza para administrar michael dinero en randi de que usted no pueda hacerlo. [place] se denomina poder notarial duradero para asuntos financieros. [place]ila es un documento legal diferente del poder notarial duradero para asuntos de rojas. Usted puede elegir a michael apoderado para asuntos de rojas o a ryland persona diferente para que actúe dahlia michael apoderado en los asuntos de dinero.  Si no asigna un apoderado, o si se considera que el apoderado no está actuando para michael odalis, un tribunal podrá designar un  legal para que actúe en representación suya.  El testamento vital  El testamento vital es un conjunto de instrucciones en las que se establecen annabel deseos acerca de la atención médica  para el momento en que no pueda expresarlos usted mismo. Los médicos deben conservar ryland copia del testamento vital en michael historia clínica. Le recomendamos que entregue ryland copia a familiares o amigos. Con el fin de alertar a annabel cuidadores en randi de ryland emergencia, puede colocar ryland tarjeta en la billetera que indique que tiene un testamento vital y dónde encontrarlo. El testamento vital se usa si:  Tiene ryland enfermedad terminal.  Queda discapacitado.  No puede comunicarse ni vicki decisiones.  Debe incluir las siguientes decisiones en michael testamento vital:  El uso o no uso de equipos de soporte vital, dahlia dispositivos de diálisis y máquinas respiratorias (respiradores).  Si desea ryland ONR o ryland ONIR. [place] les indica a los médicos que no usen resucitación cardiopulmonar (RCP) si se le detiene la respiración o los latidos cardíacos.  El uso o no uso de alimentación por sonda.  La administración o no administración de alimentos y líquidos.  Si desea recibir atención de alivio (cuidados paliativos) cuando el objetivo sea la comodidad más que la angeles.  Si desea donar annabel órganos y tejidos.  El testamento vital no da instrucciones acerca de la distribución del dinero ni las propiedades en randi de fallecimiento.  ONR u ONIR  La ONR es el pedido de no realizar RCP en el randi de que el corazón o la respiración se detengan. Si no se realizó ni se presentó ryland orden ONR u ONIR, el médico intentará ayudar a cualquier paciente cuyo corazón o respiración se haya detenido. Si planea someterse a ryland cirugía, hable con michael médico sobre el cumplimiento de michael ONR u ONIR en randi de que surjan problemas.  ¿Qué sucede si no tengo ryland voluntad anticipada?  Algunos estados asignan familiares a cargo de la bárbara de decisiones para que actúen en representación suya si no tiene ryland voluntad anticipada. Cada estado tiene annabel propias leyes sobre las voluntades anticipadas. Es posible que desee consultar a michael médico, michael abogado o al  representante estatal acerca de las leyes de michael estado.  Resumen  Las voluntades anticipadas son documentos legales que le permiten vicki decisiones acerca de michael tratamiento médico y atención de la rojas en randi de no poder expresarse usted mismo.  El proceso de elaborar y redactar las voluntades anticipadas debe realizarse con el transcurso del tiempo. Usted puede cambiar y actualizar las voluntades anticipadas en cualquier momento.  Las voluntades anticipadas pueden incluir un poder médico, un testamento vital y ryland orden ONR u ONIR.  Esta información no tiene dahlia fin reemplazar el consejo del médico. Asegúrese de hacerle al médico cualquier pregunta que tenga.  Document Revised: 10/14/2021 Document Reviewed: 10/14/2021  Elsevier Patient Education © 2024 [employer] Inc.

## 2025-04-18 ENCOUNTER — OFFICE VISIT (OUTPATIENT)
Dept: INTERNAL MEDICINE | Facility: CLINIC | Age: 35
End: 2025-04-18
Payer: COMMERCIAL

## 2025-04-18 VITALS
OXYGEN SATURATION: 95 % | DIASTOLIC BLOOD PRESSURE: 80 MMHG | WEIGHT: 205 LBS | BODY MASS INDEX: 31.07 KG/M2 | HEART RATE: 89 BPM | HEIGHT: 68 IN | SYSTOLIC BLOOD PRESSURE: 136 MMHG | TEMPERATURE: 98 F

## 2025-04-18 DIAGNOSIS — Z13.29 SCREENING FOR THYROID DISORDER: ICD-10-CM

## 2025-04-18 DIAGNOSIS — Z13.220 LIPID SCREENING: ICD-10-CM

## 2025-04-18 DIAGNOSIS — E78.2 MIXED HYPERLIPIDEMIA: ICD-10-CM

## 2025-04-18 DIAGNOSIS — Z91.09 ENVIRONMENTAL ALLERGIES: ICD-10-CM

## 2025-04-18 DIAGNOSIS — Z13.1 DIABETES MELLITUS SCREENING: ICD-10-CM

## 2025-04-18 DIAGNOSIS — R03.0 ELEVATED BLOOD PRESSURE READING: ICD-10-CM

## 2025-04-18 DIAGNOSIS — Z23 NEED FOR HPV VACCINATION: Primary | ICD-10-CM

## 2025-04-18 DIAGNOSIS — Z11.3 SCREENING EXAMINATION FOR STD (SEXUALLY TRANSMITTED DISEASE): ICD-10-CM

## 2025-04-18 DIAGNOSIS — E55.9 VITAMIN D DEFICIENCY: ICD-10-CM

## 2025-04-18 DIAGNOSIS — Z13.21 ENCOUNTER FOR VITAMIN DEFICIENCY SCREENING: ICD-10-CM

## 2025-04-18 RX ORDER — LEVOCETIRIZINE DIHYDROCHLORIDE 5 MG/1
5 TABLET, FILM COATED ORAL DAILY
Qty: 90 TABLET | Refills: 1 | Status: SHIPPED | OUTPATIENT
Start: 2025-04-18

## 2025-04-18 NOTE — PATIENT INSTRUCTIONS
"BMI for Adults  Body mass index (BMI) is a number found using a person's weight and height. BMI can help tell how much of a person's weight is made up of fat. BMI does not measure body fat directly. It is used instead of tests that directly measure body fat, which can be difficult and expensive.  What are BMI measurements used for?  BMI is useful to:  Find out if your weight puts you at higher risk for medical problems.  Help recommend changes, such as in diet and exercise. This can help you reach a healthy weight. BMI screening can be done again to see if these changes are working.  How is BMI calculated?  Your height and weight are measured. The BMI is found from those numbers. This can be done with U.S[place] or metric measurements. Note that charts and online BMI calculators are available to help you find your BMI quickly and easily without doing these calculations.  To calculate your BMI in U.S. measurements:  Measure your weight in pounds (lb).  Multiply the number of pounds by 703.  So, for an adult who weighs 150 lb, multiply that number by 703: 150 x 703, which equals 105,450.  Measure your height in inches. Then multiply that number by itself to get a measurement called \"inches squared.\"  So, for an adult who is 70 inches tall, the \"inches squared\" measurement is 70 inches x 70 inches, which equals 4,900 inches squared.  Divide the total from step 2 (number of lb x 703) by the total from step 3 (inches squared): 105,450 ÷ 4,900 = 21.5. This is your BMI.  To calculate your BMI in metric measurements:    Measure your weight in kilograms (kg).  For this example, the weight is 70 kg.  Measure your height in meters (m). Then multiply that number by itself to get a measurement called \"meters squared.\"  So, for an adult who is 1.75 m tall, the \"meters squared\" measurement is 1.75 m x 1.75 m, which equals 3.1 meters squared.  Divide the number of kilograms (your weight) by the meters squared number. In this example: 70 " ÷ 3.1 = 22.6. This is your BMI.  What do the results mean?  BMI charts are used to see if you are underweight, normal weight, overweight, or obese. The following guidelines will be used:  Underweight: BMI less than 18.5.  Normal weight: BMI between 18.5 and 24.9.  Overweight: BMI between 25 and 29.9.  Obese: BMI of 30 or above.  BMI is a tool and cannot diagnose a condition. Talk with your health care provider about what your BMI means for you. Keep these notes in mind:  Weight includes fat and muscle. Someone with a muscular build, such as an athlete, may have a BMI that is higher than 24.9. In cases like these, BMI is not a correct measure of body fat.  If you have a BMI of 25 or higher, your provider may need to do more testing to find out if excess body fat is the cause.  BMI is measured the same way for males and females. Females usually have more body fat than males of the same height and weight.  Where to find more information  For more information about BMI, including tools to quickly find your BMI, go to:  Centers for Disease Control and Prevention: cdc.gov  American Heart Association: heart.org  National Heart, Lung, and Blood [place]: nhlbi.nih.gov  This information is not intended to replace advice given to you by your health care provider. Make sure you discuss any questions you have with your health care provider.  Document Revised: 09/07/2023 Document Reviewed: 08/31/2023  [employer] Patient Education © 2024 [employer].Voluntades anticipadas  Advance Directive    Las voluntades anticipadas son documentos legales que establecen annabel deseos sobre decisiones de atención médica. Permiten que familiares, amigos y médicos conozcan annabel deseos si usted no puede hablar por sí mismo.   Debe escribir estos documentos a lo kelsey del tiempo en lugar de todos a la vez. Pueden cambiarse y actualizarse en cualquier momento.  Los tipos de voluntades anticipadas incluyen:  Poder notarial (PN) médico.  Testamentos  vitales.  Orden de no reanimar (ONR) u orden de no intentar reanimar (ONIR).  ¿Qué es un apoderado para asuntos de rojas y un PN médico?  Al apoderado para asuntos de rojas también se le llama representante para asuntos de rojas. Es ryland persona a la que usted elige para que tome decisiones médicas por usted cuando no pueda tomarlas por sí mismo. En la mayoría de los casos, un apoderado es un amigo o familiar de confianza.  Un PN médico es un documento legal que nombra a michael apoderado. Es posible que deba estar:  Firmado.  Autenticado ante ranjana.  Fechado.  Copiado.  Atestiguado.  Añadido a michael historia clínica.  También puede elegir a alguien para que maneje michael dinero si no puede hacerlo. [place] se denomina PN duradero para asuntos financieros. Es independiente de un PN médico. Usted puede elegir a michael apoderado para asuntos de rojas o a ryland persona diferente para que actúe dahlia michael apoderado en los asuntos de dinero.  Si no tiene un apoderado, o si el apoderado puede no estar actuando en michael mejor interés, un tribunal puede elegir a un  para que actúe en michael nombre.  ¿Qué es un testamento vital?  Un testamento vital es un documento legal que establece annabel deseos sobre la atención médica. Los médicos deben conservar ryland copia del testamento vital en michael historia clínica. Le recomendamos que entregue ryland copia a familiares o amigos. También puede guardar ryland tarjeta en michael billetera para que annabel seres queridos sepan que tiene un testamento vital y dónde pueden encontrarlo. Un testamento vital puede usarse si:  Usted tiene ryland enfermedad muy grave que pondrá fin a michael kathie.  Ha sufrido yrland discapacidad.  No puede vicki decisiones ni hablar por sí mismo.  Michael testamento vital debe incluir si:  Usar o no un equipo de soporte vital. [place] puede incluir máquinas para filtrar la jess o para ayudarle a respirar.  Si desea ryland ONR o ryland ONIR. [place] le indica a los médicos que no usen resucitación cardiopulmonar (RCP) si el  corazón o la respiración se detienen.  Usar o no alimentación por sonda.  Desea que le den alimentos y líquidos.  Desea un tipo de atención que le brinde bienestar llamada cuidados paliativos. Esta puede darse cuando el objetivo del tratamiento sea el bienestar en lugar de ryland angeles.  Desea donar annabel órganos y tejidos.  Un testamento vital no dice qué hacer con michael dinero y annabel bienes si usted fallece.  ¿Qué es ryland ONR u ONIR?  Ryland ONR o ryland ONIR es ryland orden de que no le practiquen RCP. Si no tiene esa de estas órdenes, el médico intentará ayudarlo si el corazón se detiene o usted rhina de respirar.   Si planea someterse a ryland cirugía, hable con michael médico sobre michael ONR u ONIR.  ¿Qué sucede si no tengo ryland voluntad anticipada?  Cada estado tiene annabel propias leyes sobre las voluntades anticipadas. Algunos estados asignan familiares a cargo de la bárbara de decisiones para que actúen en representación suya si no tiene ryland voluntad anticipada.   Consulte a michael médico, michael abogado o al representante estatal acerca de las leyes de michael estado.  Dónde obtener más información  Cada estado tiene annabel propias leyes sobre las voluntades anticipadas. Puede consultar estas leyes en: nhpco.org  Esta información no tiene dahlia fin reemplazar el consejo del médico. Asegúrese de hacerle al médico cualquier pregunta que tenga.  Document Revised: 06/28/2024 Document Reviewed: 06/28/2024  Elsevier Patient Education © 2024 [employer].

## 2025-04-18 NOTE — PROGRESS NOTES
Office Note     Name: Mark Zamora    : 1990     MRN: [id number]     Chief Complaint  Elevated BP readings (F/u)    Subjective     History of Present Illness:  Mark Zamora is a 34 y.o. male who presents today for 6 month follow up.  Past medical history significant for vision loss of right eye, allergic rhinitis,elevated blood pressure readings.    History of Present Illness  The patient presents for a routine checkup.    The chief complaint includes maintaining overall good health. The last consultation was in 2024. No recent illnesses, chest pain, shortness of breath, or abdominal pain are reported. Bowel movements are regular. A weight loss of 3 pounds is noted, attributed to dietary changes rather than intentional weight loss efforts. Fast food and dining out have been ceased, but regular exercise is not engaged in. Coffee intake has been discontinued due to adverse stomach effects. Hernia surgery is scheduled for 2025, and circumcision was performed in 2024, with hydrocodone prescribed postoperatively. Vaping has been stopped since the last visit.    Occasional shoulder pain is experienced, managed with TheraBand exercises. This discomfort is believed to be related to his job, which involves repetitive tasks that strain the shoulders and neck. Massages have been tried for shoulder pain relief.    A refill of the Xyzal prescription for allergy management is requested. Nasacort is available at home but used sparingly.    Interest in STD testing is expressed, as biannual tests were previously conducted while on [employer].    PAST SURGICAL HISTORY:  - Circumcision in 2024  - Hernia surgery scheduled for 2025    SOCIAL HISTORY  He is currently residing with his sister and is employed in the first shift from 6:00 AM to 2:45 PM, with overtime extending until 4:45 PM. He has stopped vaping since the last visit.    Review of Systems:   Review of Systems   Constitutional:   Positive for unexpected weight loss. Negative for activity change, appetite change, diaphoresis, fatigue and unexpected weight gain.   HENT:  Negative for hearing loss.    Eyes:  Negative for visual disturbance.   Respiratory:  Negative for chest tightness and shortness of breath.    Cardiovascular:  Negative for chest pain, palpitations and leg swelling.   Gastrointestinal:  Negative for abdominal pain, blood in stool, GERD and indigestion.   Endocrine: Negative for cold intolerance and heat intolerance.   Genitourinary:  Negative for dysuria and hematuria.   Musculoskeletal:  Positive for arthralgias. Negative for myalgias.   Skin:  Negative for skin lesions.   Allergic/Immunologic: Positive for environmental allergies.   Neurological:  Negative for tremors, seizures, syncope, speech difficulty, weakness, headache, memory problem and confusion.   Hematological:  Does not bruise/bleed easily.   Psychiatric/Behavioral:  Negative for sleep disturbance and depressed mood. The patient is not nervous/anxious.        Past Medical History: No past medical history on file.    Past Surgical History:   Past Surgical History:   Procedure Laterality Date    EYE SURGERY Right     optic nerve severed during sinus surgery    KNEE CARTILAGE SURGERY Left     high [facility] in [facility]    SINUS SURGERY  2012       Family History:   Family History   Problem Relation Age of Onset    Hypertension Mother     Hypertension Father        Social History:   Social History     Socioeconomic History    Marital status: Single   Tobacco Use    Smoking status: Some Days     Types: Cigarettes   Vaping Use    Vaping status: Former    Substances: Nicotine   Substance and Sexual Activity    Alcohol use: Yes     Alcohol/week: 1.0 standard drink of alcohol     Types: 1 Cans of beer per week    Drug use: Never       Immunizations:   Immunization History   Administered Date(s) Administered    COVID-19 (MALGORZATA) 04/02/2021    Hpv9 08/23/2024, 10/01/2024     "Pneumococcal Conjugate 20-Valent (PCV20) 10/01/2024    Tdap 08/23/2024        Medications:     Current Outpatient Medications:     levocetirizine (XYZAL) 5 MG tablet, Take 1 tablet by mouth Daily., Disp: 90 tablet, Rfl: 1    Allergies:   No Known Allergies    Objective     Vital Signs  /80 (BP Location: Left arm, Patient Position: Sitting, Cuff Size: Adult)   Pulse 89   Temp 98 °F (36.7 °C) (Infrared)   Ht 173 cm (68.11\")   Wt 93 kg (205 lb)   SpO2 95%   BMI 31.07 kg/m²   Body mass index is 31.07 kg/m².     BMI is >= 30 and <35. (Class 1 Obesity). The following options were offered after discussion;: weight loss educational material (shared in after visit summary), exercise counseling/recommendations, and nutrition counseling/recommendations       Physical Exam  Vitals reviewed.   Constitutional:       Appearance: Normal appearance. He is well-developed.   HENT:      Head: Normocephalic and atraumatic.      Right Ear: Hearing, tympanic membrane, ear canal and external ear normal.      Left Ear: Hearing, tympanic membrane, ear canal and external ear normal.      Nose: Nose normal.      Mouth/Throat:      Pharynx: Uvula midline.   Eyes:      General: Lids are normal.      Conjunctiva/sclera: Conjunctivae normal.      Pupils: Pupils are equal, round, and reactive to light.   Cardiovascular:      Rate and Rhythm: Normal rate and regular rhythm.      Heart sounds: Normal heart sounds.   Pulmonary:      Effort: Pulmonary effort is normal.      Breath sounds: Normal breath sounds.   Abdominal:      General: Bowel sounds are normal.      Palpations: Abdomen is soft.   Musculoskeletal:         General: Normal range of motion.      Cervical back: Full passive range of motion without pain, normal range of motion and neck supple.   Skin:     General: Skin is warm and dry.   Neurological:      Mental Status: He is alert and oriented to person, place, and time.      Deep Tendon Reflexes: Reflexes are normal and " symmetric.   Psychiatric:         Speech: Speech normal.         Behavior: Behavior normal.         Thought Content: Thought content normal.         Judgment: Judgment normal.        Procedures     Results:  No results found for this or any previous visit (from the past 24 hours).     Assessment and Plan     Assessment/Plan:  Diagnoses and all orders for this visit:    1. Need for HPV vaccination (Primary)  -     HPV Vaccine    2. Mixed hyperlipidemia    3. Vitamin D deficiency  -     Cancel: Vitamin D,25-Hydroxy; Future  -     Vitamin D,25-Hydroxy; Future    4. Screening for thyroid disorder  -     Cancel: T3, Free; Future  -     Cancel: T4, Free; Future  -     Cancel: TSH; Future  -     TSH; Future  -     T4, Free; Future  -     T3, Free; Future    5. Diabetes mellitus screening  -     Cancel: CBC & Differential; Future  -     Cancel: Comprehensive Metabolic Panel; Future  -     Cancel: Hemoglobin A1c; Future  -     Hemoglobin A1c; Future  -     Comprehensive Metabolic Panel; Future  -     CBC & Differential; Future    6. Encounter for vitamin deficiency screening  -     Cancel: Vitamin D,25-Hydroxy; Future  -     Cancel: Vitamin B12; Future  -     Vitamin B12; Future  -     Vitamin D,25-Hydroxy; Future    7. Lipid screening  -     Cancel: Lipid Panel; Future  -     Cancel: Microalbumin / Creatinine Urine Ratio - Urine, Clean Catch; Future  -     Microalbumin / Creatinine Urine Ratio - Urine, Clean Catch; Future  -     Lipid Panel; Future    8. Screening examination for STD (sexually transmitted disease)  -     Cancel: RPR Qualitative with Reflex to Quant; Future  -     Cancel: HIV-1 / O / 2 Ag / Antibody; Future  -     Cancel: Chlamydia trachomatis, Neisseria gonorrhoeae, PCR - Urine, Urine, Clean Catch; Future  -     Chlamydia trachomatis, Neisseria gonorrhoeae, PCR - Urine, Urine, Clean Catch; Future  -     HIV-1 / O / 2 Ag / Antibody; Future  -     RPR Qualitative with Reflex to Quant; Future    9. Elevated  blood pressure reading  Overview:  Continue to record blood pressures every morning.      10. Environmental allergies  Overview:  Xyzal daily, which helps.    Orders:  -     levocetirizine (XYZAL) 5 MG tablet; Take 1 tablet by mouth Daily.  Dispense: 90 tablet; Refill: 1      Assessment & Plan  1. Routine checkup.  - Gradual weight loss observed, current weight is 205 pounds.  - Blood pressure readings are within normal limits during this visit.  - Comprehensive lab order placed to assess cholesterol levels, vitamin D levels, kidney function, liver enzymes, and STD testing.    2. Allergies.  - Prescription refill for Xyzal provided and sent to [facility].  - Advised to continue using Nasacort as needed for allergy symptoms.    3. Shoulder pain.  - Reports shoulder pain likely related to job involving repetitive motions.  - Advised to perform shoulder stretches using a Thera-Band and consider massages to alleviate discomfort.    4. Health Maintenance.  - Third dose of the HPV vaccine to be administered today.    Follow-up  Scheduled for a follow-up visit in 6 months for a physical examination.      Follow Up  Return in about 6 months (around 10/18/2025) for ROUTINE PHYSICAL.    Patient or patient representative verbalized consent for the use of Ambient Listening during the visit with  Carmenza Flores PA-C for chart documentation. 4/18/2025  14:47 EDT      Carmenza Flores PA-C   [facility] [place] [id number]